# Patient Record
Sex: FEMALE | Race: WHITE | NOT HISPANIC OR LATINO | Employment: FULL TIME | URBAN - METROPOLITAN AREA
[De-identification: names, ages, dates, MRNs, and addresses within clinical notes are randomized per-mention and may not be internally consistent; named-entity substitution may affect disease eponyms.]

---

## 2022-10-23 ENCOUNTER — HOSPITAL ENCOUNTER (EMERGENCY)
Facility: MEDICAL CENTER | Age: 57
End: 2022-10-24
Attending: STUDENT IN AN ORGANIZED HEALTH CARE EDUCATION/TRAINING PROGRAM
Payer: COMMERCIAL

## 2022-10-23 DIAGNOSIS — R25.1 TREMOR: ICD-10-CM

## 2022-10-23 LAB
ALBUMIN SERPL BCP-MCNC: 4.3 G/DL (ref 3.2–4.9)
ALBUMIN/GLOB SERPL: 1.5 G/DL
ALP SERPL-CCNC: 104 U/L (ref 30–99)
ALT SERPL-CCNC: 20 U/L (ref 2–50)
ANION GAP SERPL CALC-SCNC: 11 MMOL/L (ref 7–16)
AST SERPL-CCNC: 20 U/L (ref 12–45)
BASOPHILS # BLD AUTO: 0.7 % (ref 0–1.8)
BASOPHILS # BLD: 0.1 K/UL (ref 0–0.12)
BILIRUB SERPL-MCNC: 0.2 MG/DL (ref 0.1–1.5)
BUN SERPL-MCNC: 17 MG/DL (ref 8–22)
CALCIUM SERPL-MCNC: 9.5 MG/DL (ref 8.5–10.5)
CHLORIDE SERPL-SCNC: 104 MMOL/L (ref 96–112)
CO2 SERPL-SCNC: 26 MMOL/L (ref 20–33)
CREAT SERPL-MCNC: 0.86 MG/DL (ref 0.5–1.4)
EKG IMPRESSION: NORMAL
EOSINOPHIL # BLD AUTO: 0.11 K/UL (ref 0–0.51)
EOSINOPHIL NFR BLD: 0.7 % (ref 0–6.9)
ERYTHROCYTE [DISTWIDTH] IN BLOOD BY AUTOMATED COUNT: 43.8 FL (ref 35.9–50)
GFR SERPLBLD CREATININE-BSD FMLA CKD-EPI: 77 ML/MIN/1.73 M 2
GLOBULIN SER CALC-MCNC: 2.9 G/DL (ref 1.9–3.5)
GLUCOSE SERPL-MCNC: 170 MG/DL (ref 65–99)
HCT VFR BLD AUTO: 44.1 % (ref 37–47)
HGB BLD-MCNC: 14.5 G/DL (ref 12–16)
IMM GRANULOCYTES # BLD AUTO: 0.04 K/UL (ref 0–0.11)
IMM GRANULOCYTES NFR BLD AUTO: 0.3 % (ref 0–0.9)
LYMPHOCYTES # BLD AUTO: 3.16 K/UL (ref 1–4.8)
LYMPHOCYTES NFR BLD: 20.8 % (ref 22–41)
MCH RBC QN AUTO: 29.8 PG (ref 27–33)
MCHC RBC AUTO-ENTMCNC: 32.9 G/DL (ref 33.6–35)
MCV RBC AUTO: 90.7 FL (ref 81.4–97.8)
MONOCYTES # BLD AUTO: 0.9 K/UL (ref 0–0.85)
MONOCYTES NFR BLD AUTO: 5.9 % (ref 0–13.4)
NEUTROPHILS # BLD AUTO: 10.9 K/UL (ref 2–7.15)
NEUTROPHILS NFR BLD: 71.6 % (ref 44–72)
NRBC # BLD AUTO: 0 K/UL
NRBC BLD-RTO: 0 /100 WBC
PLATELET # BLD AUTO: 436 K/UL (ref 164–446)
PMV BLD AUTO: 10.5 FL (ref 9–12.9)
POTASSIUM SERPL-SCNC: 4 MMOL/L (ref 3.6–5.5)
PROT SERPL-MCNC: 7.2 G/DL (ref 6–8.2)
RBC # BLD AUTO: 4.86 M/UL (ref 4.2–5.4)
SODIUM SERPL-SCNC: 141 MMOL/L (ref 135–145)
WBC # BLD AUTO: 15.2 K/UL (ref 4.8–10.8)

## 2022-10-23 PROCEDURE — 96374 THER/PROPH/DIAG INJ IV PUSH: CPT

## 2022-10-23 PROCEDURE — 99284 EMERGENCY DEPT VISIT MOD MDM: CPT

## 2022-10-23 PROCEDURE — 80053 COMPREHEN METABOLIC PANEL: CPT

## 2022-10-23 PROCEDURE — 700105 HCHG RX REV CODE 258: Performed by: STUDENT IN AN ORGANIZED HEALTH CARE EDUCATION/TRAINING PROGRAM

## 2022-10-23 PROCEDURE — 85025 COMPLETE CBC W/AUTO DIFF WBC: CPT

## 2022-10-23 PROCEDURE — 700111 HCHG RX REV CODE 636 W/ 250 OVERRIDE (IP): Performed by: STUDENT IN AN ORGANIZED HEALTH CARE EDUCATION/TRAINING PROGRAM

## 2022-10-23 PROCEDURE — 93005 ELECTROCARDIOGRAM TRACING: CPT | Performed by: STUDENT IN AN ORGANIZED HEALTH CARE EDUCATION/TRAINING PROGRAM

## 2022-10-23 PROCEDURE — 36415 COLL VENOUS BLD VENIPUNCTURE: CPT

## 2022-10-23 PROCEDURE — 93005 ELECTROCARDIOGRAM TRACING: CPT

## 2022-10-23 RX ORDER — LORAZEPAM 2 MG/ML
1 INJECTION INTRAMUSCULAR ONCE
Status: COMPLETED | OUTPATIENT
Start: 2022-10-23 | End: 2022-10-23

## 2022-10-23 RX ORDER — SODIUM CHLORIDE, SODIUM LACTATE, POTASSIUM CHLORIDE, CALCIUM CHLORIDE 600; 310; 30; 20 MG/100ML; MG/100ML; MG/100ML; MG/100ML
1000 INJECTION, SOLUTION INTRAVENOUS ONCE
Status: COMPLETED | OUTPATIENT
Start: 2022-10-23 | End: 2022-10-24

## 2022-10-23 RX ADMIN — LORAZEPAM 1 MG: 2 INJECTION INTRAMUSCULAR; INTRAVENOUS at 22:25

## 2022-10-23 RX ADMIN — SODIUM CHLORIDE, POTASSIUM CHLORIDE, SODIUM LACTATE AND CALCIUM CHLORIDE 1000 ML: 600; 310; 30; 20 INJECTION, SOLUTION INTRAVENOUS at 22:25

## 2022-10-24 VITALS
TEMPERATURE: 99.1 F | HEIGHT: 61 IN | WEIGHT: 190 LBS | BODY MASS INDEX: 35.87 KG/M2 | OXYGEN SATURATION: 93 % | HEART RATE: 98 BPM | DIASTOLIC BLOOD PRESSURE: 75 MMHG | SYSTOLIC BLOOD PRESSURE: 187 MMHG | RESPIRATION RATE: 21 BRPM

## 2022-10-24 NOTE — ED PROVIDER NOTES
"CHIEF COMPLAINT  Chief Complaint   Patient presents with    Tremors       HPI  Renuka Aleman is a 57 y.o. female who presents evaluation of diffuse body shaking.  Patient states that she was eating dinner with her family today, when she became very sweaty and lightheaded felt like she was going to pass out.  She is visiting in town from [place] went back to her hotel room, shortly thereafter she stated that she began to have uncontrollable body shaking, her upper and lower extremities were shaking, she began to feel extremely stressed out and anxious during this.  Was awake through the entire time no history of seizure disorder, patient denies any headache visual changes numbness tingling weakness in extremities.  The tremors have since resolved without any intervention prior to my assessment.  In regards to the syncopal episode, no current chest pain shortness of breath no current dizziness lightheadedness.  No other complaints      REVIEW OF SYSTEMS  See HPI for further details. All other systems are negative.     PAST MEDICAL HISTORY       SOCIAL HISTORY  Social History     Tobacco Use    Smoking status: Never    Smokeless tobacco: Never   Vaping Use    Vaping Use: Never used   Substance and Sexual Activity    Alcohol use: Not Currently    Drug use: Never    Sexual activity: Not on file       SURGICAL HISTORY  patient denies any surgical history    CURRENT MEDICATIONS  Home Medications    **Home medications have not yet been reviewed for this encounter**         ALLERGIES  No Known Allergies    FAMILY HISTORY  No pertinent family history    PHYSICAL EXAM   BP (!) 154/86   Pulse (!) 102   Temp 37.3 °C (99.1 °F) (Temporal)   Resp 15   Ht 1.549 m (5' 1\")   Wt 86.2 kg (190 lb)   SpO2 95%   BMI 35.90 kg/m²  @CODY[800429::@   Pulse ox interpretation: I interpret this pulse ox as normal.  VITALS - vital signs documented prior to this note have been reviewed and noted,  GENERAL - awake, alert, oriented, GCS 15, " no apparent distress, non-toxic  appearing  HEENT - normocephalic, atraumatic, pupils equal, sclera anicteric, mucus  membranes moist  NECK - supple, no meningismus, full active range of motion, trachea midline  CARDIOVASCULAR - regular rate/rhythm, no murmurs/gallops/rubs  PULMONARY - no respiratory distress, speaking in full sentences, clear to  auscultation bilaterally, no wheezing/ronchi/rales, no accessory muscle use  GASTROINTESTINAL - soft, non-tender, non-distended, no rebound, guarding,  or peritonitis  GENITOURINARY - Deferred  NEUROLOGIC -  Cranial nerves II through XII are intact pupils are equally round reactive to light right upper and left upper extremity hand , flexion at elbow, extension at the elbow 5 out of 5,   right lower left lower extremity leg raise, plantar flexion, dorsiflexion 5 out of 5 bilaterally, sensation is grossly intact in all extremities patellar DTRs are 2+ bilaterally no truncal ataxia normal finger-to-nose no appreciable papilledema on funduscopic exam  MUSCULOSKELETAL - no obvious asymmetry or deformities present  EXTREMITIES - warm, well-perfused, no cyanosis or significant edema  DERMATOLOGIC - warm, dry, no rashes, no jaundice  PSYCHIATRIC - normal affect, normal insight, normal concentration        EKG  EKG shows a sinus tachycardia with a normal DC QRS QTc interval there is a normal axis.  There is no ST elevation or depression to suggest ischemia no abnormal T wave inversion.  There is no STEMI pattern.  Interpretation is normal sinus tachycardic rhythm as interpreted by myself  Rate is 108  sinus tachycardia    LABS      Labs Reviewed   CBC WITH DIFFERENTIAL - Abnormal; Notable for the following components:       Result Value    WBC 15.2 (*)     MCHC 32.9 (*)     Lymphocytes 20.80 (*)     Neutrophils (Absolute) 10.90 (*)     Monos (Absolute) 0.90 (*)     All other components within normal limits   COMP METABOLIC PANEL - Abnormal; Notable for the following  components:    Glucose 170 (*)     Alkaline Phosphatase 104 (*)     All other components within normal limits   ESTIMATED GFR         Pertinent Labs & Imaging studies reviewed. (See chart for details)    RADIOLOGY  No orders to display             ED COURSE/PROCEDURES          Medications   lactated ringers (LR) bolus (1,000 mL Intravenous New Bag 10/23/22 2225)   LORazepam (ATIVAN) injection 1 mg (1 mg Intravenous Given 10/23/22 2225)               MEDICAL DECISION MAKING    Patient presented for evaluation of body shaking and a syncopal episode differential issue included was not limited to electrolyte abnormality, dehydration vasovagal orthostatic hypotension dehydration among many other considerations.  Patient has a normal neurologic exam in the emergency department no appreciable tremors.  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  Labs were obtained did show leukocytosis though were otherwise unremarkable for any abnormalities of clinical significance at this time.  She was bolused with IV fluids given a dose of Ativan and on reassessment was feeling better, she has had no appreciable tremors in the emergency department no residual syncopal episodes, unclear exact etiology of her tremor, and syncopal episode though given that she is returned to baseline is feeling better I do believe she is appropriate for outpatient management.  Blood pressure was elevated in the emergency department recommended she follow-up with her PCP.  No signs of hypertensive emergency tonight.  She will be discharged to follow-up with her PCP.  All return precautions were discussed she was discharged in a stable condition.          FINAL IMPRESSION  1.  Syncope  2.  Tremor  3 hyperglycemia  4.  Elevated blood pressure           Electronically signed by: Scott Granados D.O., 10/23/2022 11:00 PM      Dictation Disclaimer  Please note this report has been produced using speech recognition software  and  may contain errors related to that system, including errors seen in grammar,  punctuation and spelling, as well as words and phrases that may be inappropriate.  If there are any questions or concerns, please feel free to contact the dictating  physician for clarification.

## 2022-10-24 NOTE — ED NOTES
Pt aox4, skin pink warm and dry, airway patent, rr even and unlabored, NAD noted. No new complaints. Pt vss. Ambulates upon discharge without new incident or distress.

## 2022-10-24 NOTE — ED NOTES
Dr. Granados notified of elevated blood pressure upon discharge. Pt to follow up with PCP. Pt denies chest pain, shortness of breath or abdominal pain at this time. No new orders. Pt ok for discharge per ERP.

## 2022-10-24 NOTE — ED NOTES
Pt resting, airway patent, rr even and unlabored, equal chest rise and fall. NAD noted. Rails up times two, call light within reach.